# Patient Record
Sex: FEMALE | URBAN - NONMETROPOLITAN AREA
[De-identification: names, ages, dates, MRNs, and addresses within clinical notes are randomized per-mention and may not be internally consistent; named-entity substitution may affect disease eponyms.]

---

## 2024-07-12 ENCOUNTER — OFFICE VISIT (OUTPATIENT)
Dept: URBAN - NONMETROPOLITAN AREA CLINIC 4 | Facility: CLINIC | Age: 34
End: 2024-07-12
Payer: SELF-PAY

## 2024-07-12 ENCOUNTER — LAB OUTSIDE AN ENCOUNTER (OUTPATIENT)
Dept: URBAN - NONMETROPOLITAN AREA CLINIC 4 | Facility: CLINIC | Age: 34
End: 2024-07-12

## 2024-07-12 ENCOUNTER — DASHBOARD ENCOUNTERS (OUTPATIENT)
Age: 34
End: 2024-07-12

## 2024-07-12 VITALS
WEIGHT: 115.2 LBS | BODY MASS INDEX: 21.75 KG/M2 | TEMPERATURE: 98.1 F | HEIGHT: 61 IN | SYSTOLIC BLOOD PRESSURE: 95 MMHG | DIASTOLIC BLOOD PRESSURE: 69 MMHG | HEART RATE: 57 BPM

## 2024-07-12 DIAGNOSIS — R19.5 LOOSE STOOLS: ICD-10-CM

## 2024-07-12 DIAGNOSIS — R14.0 ABDOMINAL BLOATING: ICD-10-CM

## 2024-07-12 DIAGNOSIS — R11.0 NAUSEA: ICD-10-CM

## 2024-07-12 DIAGNOSIS — R12 BURNING REFLUX: ICD-10-CM

## 2024-07-12 PROBLEM — 64379006: Status: ACTIVE | Noted: 2024-07-12

## 2024-07-12 PROCEDURE — 99204 OFFICE O/P NEW MOD 45 MIN: CPT | Performed by: REGISTERED NURSE

## 2024-07-12 RX ORDER — OMEPRAZOLE 20 MG/1
1 CAPSULE 30 MINUTES BEFORE MORNING MEAL CAPSULE, DELAYED RELEASE ORAL ONCE A DAY
Qty: 30 | Refills: 1 | OUTPATIENT
Start: 2024-07-12

## 2024-07-12 NOTE — HPI-TODAY'S VISIT:
7/12/24: Pt is a 35 yo female with PMH of arthritis and asthma who was referred by Dr. Johnson Owen for evaluation of weight loss.  A copy of this document will be sent to the referring physician.   Pt reports approx 50 lb weight loss over past year and a half. She has associated early satiety, nausea, poor appetite, bloating, gas and diarrhea. Typically has 1 BM daily, but can go a few days without a BM. Has occasional incomplete evacuation. Reports occasional heartburn and reflux. Denies vomiting, dysphagia, melena, hematochezia. Drinks alcohol 1-2 times per month. Vapes nicotine. Smokes MJ 2-3 times per week. Denies FHx of GI malignancy. Grandfather has had gallbladder removed. No FHx of celiac or IBD. Seen by PCP in April and labs were normal.

## 2024-08-16 ENCOUNTER — TELEPHONE ENCOUNTER (OUTPATIENT)
Dept: URBAN - NONMETROPOLITAN AREA CLINIC 4 | Facility: CLINIC | Age: 34
End: 2024-08-16

## 2024-10-29 ENCOUNTER — OFFICE VISIT (OUTPATIENT)
Dept: URBAN - METROPOLITAN AREA SURGERY CENTER 14 | Facility: SURGERY CENTER | Age: 34
End: 2024-10-29

## 2024-10-29 RX ORDER — OMEPRAZOLE 20 MG/1
1 CAPSULE 30 MINUTES BEFORE MORNING MEAL CAPSULE, DELAYED RELEASE ORAL ONCE A DAY
Qty: 30 | Refills: 1 | Status: ACTIVE | COMMUNITY
Start: 2024-07-12

## 2024-11-07 ENCOUNTER — TELEPHONE ENCOUNTER (OUTPATIENT)
Dept: URBAN - METROPOLITAN AREA CLINIC 54 | Facility: CLINIC | Age: 34
End: 2024-11-07